# Patient Record
Sex: FEMALE | Race: WHITE
[De-identification: names, ages, dates, MRNs, and addresses within clinical notes are randomized per-mention and may not be internally consistent; named-entity substitution may affect disease eponyms.]

---

## 2018-01-20 ENCOUNTER — HOSPITAL ENCOUNTER (EMERGENCY)
Dept: HOSPITAL 17 - PHED | Age: 73
Discharge: HOME | End: 2018-01-20
Payer: MEDICARE

## 2018-01-20 DIAGNOSIS — K58.9: ICD-10-CM

## 2018-01-20 DIAGNOSIS — H81.10: Primary | ICD-10-CM

## 2018-01-20 DIAGNOSIS — F03.90: ICD-10-CM

## 2018-01-20 DIAGNOSIS — R11.2: ICD-10-CM

## 2018-01-20 LAB
ALBUMIN: 3.3 GM/DL (ref 3.4–5)
ALKALINE PHOSPHATASE: 42 U/L (ref 45–117)
ALT (GPT): 26 U/L (ref 10–53)
ANION GAP: 7 MEQ/L (ref 5–15)
AST (GOT): 28 U/L (ref 15–37)
AUTOMATED NEUTROPHIL #: 3.7 TH/MM3 (ref 1.8–7.7)
BASOPHIL #: 0 TH/MM3 (ref 0–0.2)
BASOPHIL %: 0.8 % (ref 0–2)
BICARBONATE: 24.3 MEQ/L (ref 21–32)
BILIRUB SERPL-MCNC: 0.5 MG/DL (ref 0.2–1)
BLOOD UREA NITROGEN: 17 MG/DL (ref 7–18)
CALCIUM: 9 MG/DL (ref 8.5–10.1)
CHLORIDE: 107 MEQ/L (ref 98–107)
CREATININE: 0.54 MG/DL (ref 0.5–1)
EOSINOPHIL #: 0 TH/MM3 (ref 0–0.4)
EOSINOPHIL %: 0.9 % (ref 0–4)
GLOMERULAR FILTRATION RATE: 111 ML/MIN (ref 89–?)
GLUCOSE,RANDOM: 99 MG/DL (ref 74–106)
HEMATOCRIT: 37.1 % (ref 35–46)
HEMO FLAGS: (no result)
HEMOGLOBIN: 11.6 GM/DL (ref 11.6–15.3)
LYMPH %: 11.7 % (ref 9–44)
LYMPHOCYTE #: 0.6 TH/MM3 (ref 1–4.8)
MEAN CELL VOLUME: 94.5 FL (ref 80–100)
MEAN CORPUSCULAR HEMOGLOBIN: 29.5 PG (ref 27–34)
MEAN CORPUSCULAR HGB CONC: 31.2 % (ref 32–36)
MEAN PLATELET VOLUME: 9.7 FL (ref 7–11)
MONO %: 8.8 % (ref 0–8)
MONOCYTE #: 0.4 TH/MM3 (ref 0–0.9)
NEUT %: 77.8 % (ref 16–70)
PLATELET COUNT: 193 TH/MM3 (ref 150–450)
POTASSIUM: 4.2 MEQ/L (ref 3.5–5.1)
RED BLOOD COUNT: 3.92 MIL/MM3 (ref 4–5.3)
RED CELL DISTRIBUTION WIDTH: 12.4 % (ref 11.6–17.2)
SODIUM (NA): 138 MEQ/L (ref 136–145)
TOTAL PROTEIN: 6.5 GM/DL (ref 6.4–8.2)
WHITE BLOOD COUNT: 4.7 TH/MM3 (ref 4–11)

## 2018-01-20 PROCEDURE — 85025 COMPLETE CBC W/AUTO DIFF WBC: CPT

## 2018-01-20 PROCEDURE — 99284 EMERGENCY DEPT VISIT MOD MDM: CPT

## 2018-01-20 PROCEDURE — 96374 THER/PROPH/DIAG INJ IV PUSH: CPT

## 2018-01-20 PROCEDURE — 80053 COMPREHEN METABOLIC PANEL: CPT

## 2018-01-20 RX ADMIN — ONDANSETRON 1 MG: 2 INJECTION, SOLUTION INTRAMUSCULAR; INTRAVENOUS at 12:51

## 2018-01-20 RX ADMIN — MECLIZINE HYDROCHLORIDE 1 MG: 25 TABLET ORAL at 12:51

## 2018-03-21 ENCOUNTER — HOSPITAL ENCOUNTER (OUTPATIENT)
Dept: HOSPITAL 17 - PHSDC | Age: 73
Discharge: HOME | End: 2018-03-21
Attending: PAIN MEDICINE
Payer: MEDICARE

## 2018-03-21 DIAGNOSIS — M54.5: Primary | ICD-10-CM

## 2018-03-21 PROCEDURE — 64636 DESTROY L/S FACET JNT ADDL: CPT

## 2018-03-21 PROCEDURE — 99153 MOD SED SAME PHYS/QHP EA: CPT

## 2018-03-21 PROCEDURE — 64635 DESTROY LUMB/SAC FACET JNT: CPT

## 2018-03-21 PROCEDURE — 99152 MOD SED SAME PHYS/QHP 5/>YRS: CPT

## 2018-03-21 NOTE — M6
cc:

HENRIQUE Jarrell MD

****

 

 

DATE:

03/21/2018

 

YOB: 1945

 

PROCEDURE:

Radiofrequency rhizotomy bilateral lumbar facet joints (bilateral L3-4

and L4-5, and L5-S1 facet joints.

 

History and physical was completed and signed.  Consent was signed.  

Procedure site was marked.  Medications were listed and reconciled.  

Pain score was recorded.  Allergies were noted.  Time out was taken.  

Fluoroscopy time was recorded where applicable.

 

Sedation was administered or directed by Dr. Jarrell.  The patient 

was given oxygen.  The patient was monitored by a registered nurse.  

Total procedure time was greater than 15 minutes.

 

Three levels are being done because imaging studies show arthritis in 

all lumbar facet joints and because each facet joint is innervated by 

the medial branches from the nerves above and below that particular 

joint.

 

The patient reported 50% or greater pain relief from previous 

diagnostic facet joint blocks done with fluoroscopic guidance.

 

PROCEDURE:

An IV was started, blood pressure cuff, pulse oximeter and EKG were 

applied. The patient was placed in the prone position on a Sergio 

table, sedated with small amounts of Versed and fentanyl and propofol 

titrated to effect. Vital signs were monitored and remained stable 

throughout the procedure. The lumbar area was scrubbed with 

antimicrobial solution, prepped with 10% Betadine solution, draped 

with sterile drapes. Fluoroscopy was used in a slightly oblique angle 

(Ralph dog view) to clearly visualize the target areas which were the

cephalad most medial angle of the transverse processes as they met the

pedicle in the anatomical location of the medial branch of the 

posterior primary ramus on the bilateral L3-4, L4-5 and L5-S1 facet 

joints. The skin was infiltrated with 1% Xylocaine using a 27 gauge 

needle. An insulated 20 gauge radiofrequency needle with a 10-mm 

curved tip was advanced to the above-mentioned target areas. 

Fluoroscopy was used to confirm the needle was properly placed and not

near the nerve root. At no time did the patient report any 

paresthesias down the lower extremity. Once properly positioned 

thermal lesions took place at 80 degrees Centigrade x 100 seconds at 

each location. Then, a small amount of Depo-Medrol was injected at 

each location for a total of 40 mg of Depo-Medrol. Following this the 

patient was taken to the recovery room with stable vital signs, 

neurologically intact.

 

 

 

__________________________________

W. MD SCOTT Pappas/JL

D: 03/21/2018, 08:11 AM

T: 03/21/2018, 09:03 AM

Visit #: [id number]

Job #: [id number]